# Patient Record
Sex: MALE | Race: WHITE | URBAN - METROPOLITAN AREA
[De-identification: names, ages, dates, MRNs, and addresses within clinical notes are randomized per-mention and may not be internally consistent; named-entity substitution may affect disease eponyms.]

---

## 2017-12-27 ENCOUNTER — TELEPHONE (OUTPATIENT)
Dept: FAMILY MEDICINE | Facility: CLINIC | Age: 27
End: 2017-12-27

## 2017-12-27 NOTE — TELEPHONE ENCOUNTER
ABDOMINAL   PAIN     Onset: off and on for about 6 months    Description:   Character: Cramping and heavy  Location: right upper quadrant rib cage area  Radiation: Back    Intensity: mild    Progression of Symptoms:  same and intermittent    Accompanying Signs & Symptoms:  Fever/Chills?: no   Gas/Bloating: no   Nausea: no   Vomitting: no   Diarrhea?: no   Constipation:no   Dysuria or Hematuria: no      Precipitating factors:   Does the pain change with:     Food: no      BM: no     Urination: no           Patient calling to report reoccuring abdominal pain that started 6 months ago and became worse within the last 2 weeks. He states it comes and goes but now when exercising he reports the pain radiates to his back. He also reports developing some shortness of breath but states his ears feel plugged as well and has some congestion. Patient would like to be evaluated, no openings today. Scheduled for tomorrow, advised if develops any of the above symptoms or if the pain worsens to be evaluated in UC before tomorrow's appointment. Patient/ parent verbalized understanding and agrees with plan.    Zina Alex RN   [facility] - Triage

## 2017-12-28 ENCOUNTER — OFFICE VISIT (OUTPATIENT)
Dept: FAMILY MEDICINE | Facility: CLINIC | Age: 27
End: 2017-12-28
Payer: COMMERCIAL

## 2017-12-28 VITALS
SYSTOLIC BLOOD PRESSURE: 120 MMHG | TEMPERATURE: 97.8 F | WEIGHT: 156 LBS | BODY MASS INDEX: 21.16 KG/M2 | DIASTOLIC BLOOD PRESSURE: 64 MMHG | HEART RATE: 60 BPM

## 2017-12-28 DIAGNOSIS — R10.11 ABDOMINAL PAIN, RIGHT UPPER QUADRANT: Primary | ICD-10-CM

## 2017-12-28 PROCEDURE — 36415 COLL VENOUS BLD VENIPUNCTURE: CPT | Performed by: FAMILY MEDICINE

## 2017-12-28 PROCEDURE — 80053 COMPREHEN METABOLIC PANEL: CPT | Performed by: FAMILY MEDICINE

## 2017-12-28 PROCEDURE — 99214 OFFICE O/P EST MOD 30 MIN: CPT | Performed by: FAMILY MEDICINE

## 2017-12-28 NOTE — NURSING NOTE
Chief Complaint   Patient presents with     Abdominal Pain     right sided        Initial /64  Pulse 60  Temp 97.8  F (36.6  C) (Tympanic)  Wt 156 lb (70.8 kg)  BMI 21.16 kg/m2 Estimated body mass index is 21.16 kg/(m^2) as calculated from the following:    Height as of 5/1/15: 6' (1.829 m).    Weight as of this encounter: 156 lb (70.8 kg).  Medication Reconciliation: complete    No current outpatient prescriptions on file.       Miguel ONEILL, CMA

## 2017-12-28 NOTE — MR AVS SNAPSHOT
"              After Visit Summary   2017    Manfred Escalona    MRN: [id number]           Patient Information     Date Of Birth          1990        Visit Information        Provider Department      2017 3:20 PM [facility]        Today's Diagnoses     Abdominal pain, right upper quadrant    -  1       Follow-ups after your visit        Future tests that were ordered for you today     Open Future Orders        Priority Expected Expires Ordered    US Abdomen Limited Routine  2018            Who to contact     If you have questions or need follow up information about today's clinic visit or your schedule please contact [facility] [place] directly at [phone].  Normal or non-critical lab and imaging results will be communicated to you by MyChart, letter or phone within 4 business days after the clinic has received the results. If you do not hear from us within 7 days, please contact the clinic through Eloquahart or phone. If you have a critical or abnormal lab result, we will notify you by phone as soon as possible.  Submit refill requests through Koru or call your pharmacy and they will forward the refill request to us. Please allow 3 business days for your refill to be completed.          Additional Information About Your Visit        MyChart Information     Koru lets you send messages to your doctor, view your test results, renew your prescriptions, schedule appointments and more. To sign up, go to [email or web address] . Click on \"Log in\" on the left side of the screen, which will take you to the Welcome page. Then click on \"Sign up Now\" on the right side of the page.     You will be asked to enter the access code listed below, as well as some personal information. Please follow the directions to create your username and password.     Your access code is: [email or web address]  Expires: 3/28/2018  3:54 PM     Your access code will  " in 90 days. If you need help or a new code, please call your [facility] clinic or [phone].        Care EveryWhere ID     This is your Care EveryWhere ID. This could be used by other organizations to access your [facility] medical records  [id number]        Your Vitals Were     Pulse Temperature BMI (Body Mass Index)             60 97.8  F (36.6  C) (Tympanic) 21.16 kg/m2          Blood Pressure from Last 3 Encounters:   12/28/17 120/64   05/01/15 104/65    Weight from Last 3 Encounters:   12/28/17 156 lb (70.8 kg)   05/01/15 179 lb 3.2 oz (81.3 kg)              We Performed the Following     Comprehensive metabolic panel        Primary Care Provider Office Phone # Fax #    Colten Dupont [phone]       [address]  [place] [address]        Equal Access to Services     [facility]: Hadii aad ku hadasho Soomaali, waaxda luqadaha, qaybta kaalmada adeegyada, waxay cindyin haybeaun veronica salinas . So [facility] [phone].    ATENCIÓN: Si habla español, tiene a thomas disposición servicios gratuitos de asistencia lingüística. Delmerame al [phone].    We comply with applicable federal civil rights laws and [place] laws. We do not discriminate on the basis of race, color, national origin, age, disability, sex, sexual orientation, or gender identity.            Thank you!     Thank you for choosing [facility] [place]  for your care. Our goal is always to provide you with excellent care. Hearing back from our patients is one way we can continue to improve our services. Please take a few minutes to complete the written survey that you may receive in the mail after your visit with us. Thank you!             Your Updated Medication List - Protect others around you: Learn how to safely use, store and throw away your medicines at www.disposemymeds.org.      Notice  As of 12/28/2017  3:54 PM    You have not been prescribed any medications.

## 2017-12-28 NOTE — PROGRESS NOTES
SUBJECTIVE:   Manfred Escalona is a 27 year old male who presents to clinic today for the following health issues:      ABDOMINAL   PAIN     Onset: off and on for about 6 months    Description:   Character: Cramping and heavy  Location: right upper quadrant rib cage area  Radiation: Back    Intensity: mild    Progression of Symptoms:  same and intermittent    Accompanying Signs & Symptoms:  Fever/Chills?: no   Gas/Bloating: no   Nausea: no   Vomitting: no   Diarrhea?: no   Constipation:no   Dysuria or Hematuria: no       Precipitating factors:   Does the pain change with:     Food: no      BM: no     Urination: no        Patient has right upper quadrant discomfort mostly..  Denies any nausea vomiting likely changes no worsening with use of fatty food.  No current nausea or vomiting no fever chills.  He feels he just overthink about his symptoms and stretching make it better.  Denies any other symptoms at this time          Problem list and histories reviewed & adjusted, as indicated.  Additional history: as documented    Patient Active Problem List   Diagnosis     Generalized anxiety disorder     No past surgical history on file.    Social History   Substance Use Topics     Smoking status: Never Smoker     Smokeless tobacco: Never Used     Alcohol use Yes     History reviewed. No pertinent family history.      No current outpatient prescriptions on file.         Reviewed and updated as needed this visit by clinical staffTobacco  Allergies  Meds  Fam Hx  Soc Hx      Reviewed and updated as needed this visit by Provider         ROS:  C: NEGATIVE for fever, chills, change in weight  E/M: NEGATIVE for ear, mouth and throat problems  R: NEGATIVE for significant cough or SOB  CV: NEGATIVE for chest pain, palpitations or peripheral edema  GI: NEGATIVE for nausea, abdominal pain, heartburn, or change in bowel habits    OBJECTIVE:                                                    /64  Pulse 60  Temp 97.8  F  (36.6  C) (Tympanic)  Wt 156 lb (70.8 kg)  BMI 21.16 kg/m2  Body mass index is 21.16 kg/(m^2).   GENERAL: healthy, alert, well nourished, well hydrated, no distress  NECK: no tenderness, no adenopathy, no asymmetry, no masses, no stiffness; thyroid- normal to palpation  RESP: lungs clear to auscultation - no rales, no rhonchi, no wheezes  CV: regular rates and rhythm, normal S1 S2, no S3 or S4 and no murmur, no click or rub -  ABDOMEN: soft, no tenderness, no  hepatosplenomegaly, no masses, normal bowel sounds  larson sign is negative     ASSESSMENT/PLAN:                                                        ICD-10-CM    1. Abdominal pain, right upper quadrant R10.11 Comprehensive metabolic panel     US Abdomen Limited   Patient symptoms most likely secondary to abdominal wall strain .  Patient has spasmodic kind of discomfort.  I advised him we should get some blood work including liver and kidney function.  We also discussed about ultrasound of gallbladder.  It is ordered.  He is advised if symptoms are not getting better he should get US  done to make sure there is no underlying issue, less likely though with his clinical symptoms .  warning sign were dicussed with the patient.    Colten Dupont MD  [facility]

## 2017-12-28 NOTE — LETTER
January 2, 2018      Manfred Escalona  [address] [place] [address]        Dear ,        I have reviewed your recent labs. Here are the results:     -Liver and gallbladder tests are normal. (ALT,AST, Alk phos, bilirubin), kidney function is normal (Cr, GFR), Sodium is normal, Potassium is normal, Calcium is normal, Glucose is normal (diabetes screening test).   All labs are normal.     Resulted Orders   Comprehensive metabolic panel   Result Value Ref Range    Sodium 138 133 - 144 mmol/L    Potassium 3.8 3.4 - 5.3 mmol/L    Chloride 104 94 - 109 mmol/L    Carbon Dioxide 26 20 - 32 mmol/L    Anion Gap 8 3 - 14 mmol/L    Glucose 87 70 - 99 mg/dL    Urea Nitrogen 12 7 - 30 mg/dL    Creatinine 0.76 0.66 - 1.25 mg/dL    GFR Estimate >90 >60 mL/min/1.7m2      Comment:      Non  GFR Calc    GFR Estimate If Black >90 >60 mL/min/1.7m2      Comment:       GFR Calc    Calcium 9.2 8.5 - 10.1 mg/dL    Bilirubin Total 1.3 0.2 - 1.3 mg/dL    Albumin 4.5 3.4 - 5.0 g/dL    Protein Total 7.5 6.8 - 8.8 g/dL    Alkaline Phosphatase 85 40 - 150 U/L    ALT 45 0 - 70 U/L    AST 33 0 - 45 U/L       If you have any questions or concerns, please call the clinic at the number listed above.       Sincerely,    Colten Dupont MD

## 2017-12-29 LAB
ALBUMIN SERPL-MCNC: 4.5 G/DL (ref 3.4–5)
ALP SERPL-CCNC: 85 U/L (ref 40–150)
ALT SERPL W P-5'-P-CCNC: 45 U/L (ref 0–70)
ANION GAP SERPL CALCULATED.3IONS-SCNC: 8 MMOL/L (ref 3–14)
AST SERPL W P-5'-P-CCNC: 33 U/L (ref 0–45)
BILIRUB SERPL-MCNC: 1.3 MG/DL (ref 0.2–1.3)
BUN SERPL-MCNC: 12 MG/DL (ref 7–30)
CALCIUM SERPL-MCNC: 9.2 MG/DL (ref 8.5–10.1)
CHLORIDE SERPL-SCNC: 104 MMOL/L (ref 94–109)
CO2 SERPL-SCNC: 26 MMOL/L (ref 20–32)
CREAT SERPL-MCNC: 0.76 MG/DL (ref 0.66–1.25)
GFR SERPL CREATININE-BSD FRML MDRD: >90 ML/MIN/1.7M2
GLUCOSE SERPL-MCNC: 87 MG/DL (ref 70–99)
POTASSIUM SERPL-SCNC: 3.8 MMOL/L (ref 3.4–5.3)
PROT SERPL-MCNC: 7.5 G/DL (ref 6.8–8.8)
SODIUM SERPL-SCNC: 138 MMOL/L (ref 133–144)

## 2018-01-04 ENCOUNTER — TELEPHONE (OUTPATIENT)
Dept: FAMILY MEDICINE | Facility: CLINIC | Age: 28
End: 2018-01-04

## 2018-01-04 NOTE — TELEPHONE ENCOUNTER
.Reason for Call:  Other call back    Detailed comments: Pt calling for lab results    Phone Number Patient can be reached at: Home number on file [phone] (home)    Best Time: anytime    Can we leave a detailed message on this number? YES    Call taken on 1/4/2018 at 9:14 AM by Janeth Hurtado

## 2018-01-18 ENCOUNTER — OFFICE VISIT (OUTPATIENT)
Dept: FAMILY MEDICINE | Facility: CLINIC | Age: 28
End: 2018-01-18
Payer: COMMERCIAL

## 2018-01-18 VITALS
TEMPERATURE: 98.5 F | SYSTOLIC BLOOD PRESSURE: 94 MMHG | BODY MASS INDEX: 21.29 KG/M2 | WEIGHT: 157 LBS | DIASTOLIC BLOOD PRESSURE: 60 MMHG | HEART RATE: 60 BPM

## 2018-01-18 DIAGNOSIS — K21.9 GASTROESOPHAGEAL REFLUX DISEASE WITHOUT ESOPHAGITIS: Primary | ICD-10-CM

## 2018-01-18 PROCEDURE — 99213 OFFICE O/P EST LOW 20 MIN: CPT | Performed by: FAMILY MEDICINE

## 2018-01-18 NOTE — MR AVS SNAPSHOT
"              After Visit Summary   2018    Manfred Escalona    MRN: [id number]           Patient Information     Date Of Birth          1990        Visit Information        Provider Department      2018 3:00 PM Colten Dupont MD [facility] [place]        Today's Diagnoses     Gastroesophageal reflux disease without esophagitis    -  1       Follow-ups after your visit        Who to contact     If you have questions or need follow up information about today's clinic visit or your schedule please contact [facility] [place] directly at [phone].  Normal or non-critical lab and imaging results will be communicated to you by Bitbondhart, letter or phone within 4 business days after the clinic has received the results. If you do not hear from us within 7 days, please contact the clinic through Bitbondhart or phone. If you have a critical or abnormal lab result, we will notify you by phone as soon as possible.  Submit refill requests through SaferTaxi or call your pharmacy and they will forward the refill request to us. Please allow 3 business days for your refill to be completed.          Additional Information About Your Visit        MyChart Information     SaferTaxi lets you send messages to your doctor, view your test results, renew your prescriptions, schedule appointments and more. To sign up, go to [email or web address] . Click on \"Log in\" on the left side of the screen, which will take you to the Welcome page. Then click on \"Sign up Now\" on the right side of the page.     You will be asked to enter the access code listed below, as well as some personal information. Please follow the directions to create your username and password.     Your access code is: [email or web address]  Expires: 3/28/2018  3:54 PM     Your access code will  in 90 days. If you need help or a new code, please call your [facility] or [phone].        Care EveryWhere ID     This is your Care EveryWhere ID. " This could be used by other organizations to access your [facility] medical records  [id number]        Your Vitals Were     Pulse Temperature BMI (Body Mass Index)             60 98.5  F (36.9  C) (Tympanic) 21.29 kg/m2          Blood Pressure from Last 3 Encounters:   01/18/18 94/60   12/28/17 120/64   05/01/15 104/65    Weight from Last 3 Encounters:   01/18/18 157 lb (71.2 kg)   12/28/17 156 lb (70.8 kg)   05/01/15 179 lb 3.2 oz (81.3 kg)              Today, you had the following     No orders found for display         Today's Medication Changes          These changes are accurate as of: 1/18/18  3:26 PM.  If you have any questions, ask your nurse or doctor.               Start taking these medicines.        Dose/Directions    omeprazole 20 MG CR capsule   Commonly known as:  priLOSEC   Used for:  Gastroesophageal reflux disease without esophagitis   Started by:  Colten Dupont MD        Dose:  20 mg   Take 1 capsule (20 mg) by mouth daily   Quantity:  30 capsule   Refills:  1            Where to get your medicines      These medications were sent to [facility] [place] - [address]  [address], [place] [address]     Phone:  [phone]     omeprazole 20 MG CR capsule                Primary Care Provider Office Phone # Fax #    Colten Dupont [phone]       [address] [place] [address] [address]  [place] [address]        Equal Access to Services     [facility]: Hadii aad ku hadasho Soomaali, waaxda luqadaha, qaybta kaalmada adeegyada, bob selby. So [facility] [phone].    ATENCIÓN: Si habla español, tiene a thomas disposición servicios gratuitos de asistencia lingüística. Llame al [phone].    We comply with applicable federal civil rights laws and [place] laws. We do not discriminate on the basis of race, color, national origin, age, disability, sex, sexual orientation, or gender identity.            Thank you!      Thank you for choosing [facility] [place]  for your care. Our goal is always to provide you with excellent care. Hearing back from our patients is one way we can continue to improve our services. Please take a few minutes to complete the written survey that you may receive in the mail after your visit with us. Thank you!             Your Updated Medication List - Protect others around you: Learn how to safely use, store and throw away your medicines at www.disposemymeds.org.          This list is accurate as of: 1/18/18  3:26 PM.  Always use your most recent med list.                   Brand Name Dispense Instructions for use Diagnosis    omeprazole 20 MG CR capsule    priLOSEC    30 capsule    Take 1 capsule (20 mg) by mouth daily    Gastroesophageal reflux disease without esophagitis

## 2018-01-18 NOTE — NURSING NOTE
Chief Complaint   Patient presents with     Abdominal Pain       Initial BP 94/60  Pulse 60  Temp 98.5  F (36.9  C) (Tympanic)  Wt 157 lb (71.2 kg)  BMI 21.29 kg/m2 Estimated body mass index is 21.29 kg/(m^2) as calculated from the following:    Height as of 5/1/15: 6' (1.829 m).    Weight as of this encounter: 157 lb (71.2 kg).  Medication Reconciliation: complete    No current outpatient prescriptions on file.       Miguel ONEILL, CMA

## 2018-01-18 NOTE — PROGRESS NOTES
SUBJECTIVE:   Manfred Escalona is a 27 year old male who presents to clinic today for the following health issues:      GERD/Heartburn  Onset: 2+ weeks   Patient feels burning sensation in the back of the throat.  This has been ongoing on and off with some reflux symptoms.    Description:     Burning in chest: no     Intensity: moderate    Progression of Symptoms: worsening    Accompanying Signs & Symptoms:  Does it feel like food gets stuck: YES  Nausea: no   Vomiting (bloody?): no   Abdominal Pain: YES  Black-Tarry stools: no :  Bloody stools: no     History:   Previous ulcers: no     Precipitating factors:   Caffeine use: YES- few cups of coffee daily  Alcohol use: YES  NSAID/Aspirin use: no   Tobacco use: no  Worse with no particular food or drink.      Therapies Tried and outcome:TUMS at night          Problem list and histories reviewed & adjusted, as indicated.  Additional history: as documented    Patient Active Problem List   Diagnosis     Generalized anxiety disorder     No past surgical history on file.    Social History   Substance Use Topics     Smoking status: Never Smoker     Smokeless tobacco: Never Used     Alcohol use Yes     No family history on file.      Current Outpatient Prescriptions   Medication Sig Dispense Refill     omeprazole (PRILOSEC) 20 MG CR capsule Take 1 capsule (20 mg) by mouth daily 30 capsule 1         Reviewed and updated as needed this visit by clinical staffTobacco       Reviewed and updated as needed this visit by Provider         ROS:  C: NEGATIVE for fever, chills, change in weight  E/M: NEGATIVE for ear, mouth and throat problems  R: NEGATIVE for significant cough or SOB  CV: NEGATIVE for chest pain, palpitations or peripheral edema    OBJECTIVE:                                                    BP 94/60  Pulse 60  Temp 98.5  F (36.9  C) (Tympanic)  Wt 157 lb (71.2 kg)  BMI 21.29 kg/m2  Body mass index is 21.29 kg/(m^2).   GENERAL: healthy, alert, well nourished,  well hydrated, no distress  RESP: lungs clear to auscultation - no rales, no rhonchi, no wheezes  CV: regular rates and rhythm, normal S1 S2, no S3 or S4 and no murmur, no click or rub -  ABDOMEN: soft, no tenderness, no  hepatosplenomegaly, no masses, normal bowel sounds         ASSESSMENT/PLAN:                                                        ICD-10-CM    1. Gastroesophageal reflux disease without esophagitis K21.9 omeprazole (PRILOSEC) 20 MG CR capsule       Patient has symptoms of upper GI most likely GERD.  He is advised to use omeprazole once daily for the next 1 month.  Advised not to use excessive coffee.  Decrease caffeine intake.  Advised not to eat and sleep right away.  Keep the head little elevated.  Warning signs were discussed with the patient if symptoms continue despite this he would follow-up and also discussed advanced imaging.    Colten Dupont MD  [facility]

## 2018-01-31 ENCOUNTER — TELEPHONE (OUTPATIENT)
Dept: FAMILY MEDICINE | Facility: CLINIC | Age: 28
End: 2018-01-31

## 2018-01-31 NOTE — TELEPHONE ENCOUNTER
Non-detailed message left to return our call.  Kylah Chaudhary RN - Triage  [facility]

## 2018-01-31 NOTE — TELEPHONE ENCOUNTER
"Spoke with patient, states he has been taking omeprazole as prescribed at OV x 2 weeks and states his indigestion is getting worse. He states prior he had a feeling like food was stuck in his throat but now he feels like he is \"breathing fire\". He states it is worse in the morning and evening. He states he has been avoiding trigger foods/ beverages and has been trying not to eat within 3 hours before bed.  He is wondering if he should continue medication for another week or two, increase dose, come in for a f/u or move forward with imaging discussed at OV? Please advise.     1/18/18 OV note:    1. Gastroesophageal reflux disease without esophagitis K21.9 omeprazole (PRILOSEC) 20 MG CR capsule         Patient has symptoms of upper GI most likely GERD.  He is advised to use omeprazole once daily for the next 1 month.  Advised not to use excessive coffee.  Decrease caffeine intake.  Advised not to eat and sleep right away.  Keep the head little elevated.  Warning signs were discussed with the patient if symptoms continue despite this he would follow-up and also discussed advanced imaging.      Triage to call with response [phone] okay to leave detailed message.     Zina Alex RN   [facility] - Triage     "

## 2018-01-31 NOTE — TELEPHONE ENCOUNTER
Pt called and was seen on 1/18 for heartburn and indigestion. He has been taking prilosec and he has noticed that the symptoms are getting worse. Please call the pt and advise. Pt can be reached at [phone]. Thank you.  Rubia Connelly,

## 2018-01-31 NOTE — TELEPHONE ENCOUNTER
Detailed message left for patient with information noted from provider below. Left call back number for any additional questions.  Arlyn Greene RN  Triage-[employer]

## 2018-10-26 ENCOUNTER — OFFICE VISIT (OUTPATIENT)
Dept: FAMILY MEDICINE | Facility: CLINIC | Age: 28
End: 2018-10-26
Payer: COMMERCIAL

## 2018-10-26 VITALS
RESPIRATION RATE: 16 BRPM | SYSTOLIC BLOOD PRESSURE: 128 MMHG | BODY MASS INDEX: 21.26 KG/M2 | TEMPERATURE: 97.9 F | WEIGHT: 157 LBS | OXYGEN SATURATION: 100 % | HEIGHT: 72 IN | HEART RATE: 54 BPM | DIASTOLIC BLOOD PRESSURE: 66 MMHG

## 2018-10-26 DIAGNOSIS — Z71.84 ENCOUNTER FOR COUNSELING FOR TRAVEL: Primary | ICD-10-CM

## 2018-10-26 DIAGNOSIS — Z23 NEED FOR PROPHYLACTIC VACCINATION AND INOCULATION AGAINST INFLUENZA: ICD-10-CM

## 2018-10-26 DIAGNOSIS — Z23 NEED FOR IMMUNIZATION AGAINST TYPHOID: ICD-10-CM

## 2018-10-26 DIAGNOSIS — Z23 NEED FOR HEPATITIS A VACCINATION: ICD-10-CM

## 2018-10-26 PROCEDURE — 90686 IIV4 VACC NO PRSV 0.5 ML IM: CPT | Performed by: PHYSICIAN ASSISTANT

## 2018-10-26 PROCEDURE — 90632 HEPA VACCINE ADULT IM: CPT | Performed by: PHYSICIAN ASSISTANT

## 2018-10-26 PROCEDURE — 99401 PREV MED CNSL INDIV APPRX 15: CPT | Mod: 25 | Performed by: PHYSICIAN ASSISTANT

## 2018-10-26 PROCEDURE — 90471 IMMUNIZATION ADMIN: CPT | Performed by: PHYSICIAN ASSISTANT

## 2018-10-26 PROCEDURE — 90472 IMMUNIZATION ADMIN EACH ADD: CPT | Performed by: PHYSICIAN ASSISTANT

## 2018-10-26 PROCEDURE — 90691 TYPHOID VACCINE IM: CPT | Performed by: PHYSICIAN ASSISTANT

## 2018-10-26 RX ORDER — AZITHROMYCIN 500 MG/1
500 TABLET, FILM COATED ORAL DAILY
Qty: 9 TABLET | Refills: 0 | Status: SHIPPED | OUTPATIENT
Start: 2018-10-26 | End: 2018-10-29

## 2018-10-26 NOTE — PATIENT INSTRUCTIONS
See travel packet provided  Recommend ultrathon, pepto bismol and imodium  Also bring hand  and sun screen with you.  Safe Travels     Today October 26, 2018 you received the    Flu Vaccine    Hepatitis A Vaccine - Please return on 4/24/19 or later for your 2nd and final dose.    Typhoid - injectable. This vaccine is valid for two years.   .    These appointments can be made as a NURSE ONLY visit.    **It is very important for the vaccinations to be given on the scheduled day(s), this helps ensure you receive the full effectiveness of the vaccine.**    Please call [facility] with any questions [phone]    Thank you for visiting [facility]

## 2018-10-26 NOTE — PROGRESS NOTES
SUBJECTIVE: Manfred Escalona , a 28 year old  male, presents for counseling and information regarding upcoming travel to [place], [place], and [place]. Special medical concerns include: none. He anticipates the following unusual exposures: none.    Itinerary:  [place], [place] and [place]     Departure Date: 11/15/18 Return date: 11/24/18    Reason for travel (i.e. Business, pleasure): pleasure     Visiting an urban or rural area?: both     Accommodations (i.e. hotel, hostel, friends, family, etc): hotel and friend     Women - First day of your last period: NA     IMMUNIZATION HISTORY  Have you received any vaccinations in the past 4 weeks?  No  Have you ever fainted from having your blood drawn or from an injection?  No  Have you ever had a fever reaction to vaccination?  No  Have you ever had any bad reaction or side effect from any vaccination?  No  Have you ever had hepatitis A or B vaccine?  Yes   Do you live (or work closely) with anyone who has AIDS, an AIDS-like condition, any other immune disorder or who is on chemotherapy for cancer?  No  Have you received any injection of immune globulin or any blood products during the past 12 months?  No    GENERAL MEDICAL HISTORY  Do you have a medical condition that warrants maintenance medication or physician follow-up?  No  Do you have a medical condition that is stable now, but that may recur while traveling?  No  Has your spleen been removed?  No  Have you had an acute illness or a fever in the past 48 hours?  No  Are you pregnant, or might you become pregnant on this trip?  Any chance of pregnancy?  No  Are you breastfeeding?  No  Do you have HIV, AIDS, an AIDS-like condition, any other immune disorder, leukemia or cancer?  No  Do you have a severe combined immunodeficiency disease?  No  Have you had your thymus gland removed or history of problems with your thymus, such as myasthenia gravis, DiGeorge syndrome, or thymoma?  No    Do you have severe thrombocytopenia  (low platelet count) or a coagulation disorder?  No  Have you ever had a convulsion, seizure, epilepsy, neurologic condition or brain infection?  No  Do you have any stomach conditions?  No  Do you have a G6PD deficiency?  No  Do you have severe renal or kidney impairment?  No  Do you have a history of psychiatric problems?  No  Do you have a problem with strange dreams and/or nightmares?  No  Do you have insomnia?  No  Do you have problems with vaginitis?  No  Do you have psoriasis?  No  Are you prone to motion sickness?  No  Have you ever had headaches, nausea, vomiting, or breathing problems from altitude exposure?  No      No past medical history on file.   Immunization History   Administered Date(s) Administered     TDAP Vaccine (Adacel) 08/11/2008, 06/14/2013       Current Outpatient Prescriptions   Medication Sig Dispense Refill     omeprazole (PRILOSEC) 20 MG CR capsule Take 1 capsule (20 mg) by mouth daily 30 capsule 1     No Known Allergies     EXAM: deferred    Immunizations discussed include: Hepatitis A, Typhoid and influenza  Malaraia prophylaxis recommended: not needed  Symptomatic treatment for traveler's diarrhea: bismuth subsalicylate, loperamide/diphenoxylate and azithromycin    ASSESSMENT/PLAN:    (Z71.89) Encounter for counseling for travel  (primary encounter diagnosis)    Comment: Hepatitis A, typhoid, and influenza vaccines today. Patient will return or follow-up with PCP in 6 months for Hep A booster. Prophylaxis given for Traveler's diarrhea and is not needed for Malaria. All questions were answered.     Plan: azithromycin (ZITHROMAX) 500 MG tablet            (Z23) Need for hepatitis A vaccination  Comment:   Plan: HEPA VACCINE ADULT IM            (Z23) Need for immunization against typhoid  Comment:   Plan: TYPHOID VACCINE, IM            (Z23) Need for prophylactic vaccination and inoculation against influenza  Comment:   Plan: FLU VACCINE, SPLIT VIRUS, IM (QUADRIVALENT)         [50602]-  >3 YRS, Vaccine Administration,         Initial [33184]              I have reviewed general recommendations for safe travel   including: food/water precautions, insect avoidance, safe sex   practices given high prevalence of HIV and other STDs,   roadway safety. Educational materials and links to the CDC   Traveler's health website have been provided.    Total time 20 minutes, greater than 50 percent in counseling   and coordination of care.

## 2018-10-26 NOTE — MR AVS SNAPSHOT
After Visit Summary   10/26/2018    Manfred Escalona    MRN: [id number]           Patient Information     Date Of Birth          1990        Visit Information        Provider Department      10/26/2018 8:40 AM Sarath Ibanez PA-C [facility]        Today's Diagnoses     Encounter for counseling for travel    -  1      Care Instructions    See travel packet provided  Recommend ultrathon, pepto bismol and imodium  Also bring hand  and sun screen with you.  Safe Travels     Today October 26, 2018 you received the    Flu Vaccine    Hepatitis A Vaccine - Please return on 4/24/19 or later for your 2nd and final dose.    Typhoid - injectable. This vaccine is valid for two years.   .    These appointments can be made as a NURSE ONLY visit.    **It is very important for the vaccinations to be given on the scheduled day(s), this helps ensure you receive the full effectiveness of the vaccine.**    Please call [facility] with any questions [phone]    Thank you for visiting [facility]            Follow-ups after your visit        Who to contact     If you have questions or need follow up information about today's clinic visit or your schedule please contact [facility] directly at [phone].  Normal or non-critical lab and imaging results will be communicated to you by MyChart, letter or phone within 4 business days after the clinic has received the results. If you do not hear from us within 7 days, please contact the clinic through MyChart or phone. If you have a critical or abnormal lab result, we will notify you by phone as soon as possible.  Submit refill requests through [employer] or call your pharmacy and they will forward the refill request to us. Please allow 3 business days for your refill to be completed.          Additional Information About Your Visit        MyChart Information     [employer] lets you  "send messages to your doctor, view your test results, renew your prescriptions, schedule appointments and more. To sign up, go to [email or web address] . Click on \"Log in\" on the left side of the screen, which will take you to the Welcome page. Then click on \"Sign up Now\" on the right side of the page.     You will be asked to enter the access code listed below, as well as some personal information. Please follow the directions to create your username and password.     Your access code is: [email or web address]  Expires: 2019  9:10 AM     Your access code will  in 90 days. If you need help or a new code, please call your [facility] clinic or [phone].        Care EveryWhere ID     This is your Care EveryWhere ID. This could be used by other organizations to access your [facility] medical records  [id number]        Your Vitals Were     Pulse Temperature Respirations Height Pulse Oximetry BMI (Body Mass Index)    54 97.9  F (36.6  C) (Oral) 16 6' (1.829 m) 100% 21.29 kg/m2       Blood Pressure from Last 3 Encounters:   10/26/18 128/66   18 94/60   17 120/64    Weight from Last 3 Encounters:   10/26/18 157 lb (71.2 kg)   18 157 lb (71.2 kg)   17 156 lb (70.8 kg)              Today, you had the following     No orders found for display         Today's Medication Changes          These changes are accurate as of 10/26/18  9:10 AM.  If you have any questions, ask your nurse or doctor.               Start taking these medicines.        Dose/Directions    azithromycin 500 MG tablet   Commonly known as:  ZITHROMAX   Used for:  Encounter for counseling for travel   Started by:  Sarath Ibanez PA-C        Dose:  500 mg   Take 1 tablet (500 mg) by mouth daily for 3 days For traveler's diarrhea. May repeat if needed.   Quantity:  9 tablet   Refills:  0            Where to get your medicines      These medications were sent to [facility] [place], [place] - [address]  " [address] [address] [facility] [address]     Phone:  [phone]     azithromycin 500 MG tablet                Primary Care Provider Office Phone # Fax #    Colten Dupont [phone]       [address]  [place] [address]        Equal Access to Services     : Hadii aad ku hadasho Soomaali, waaxda luqadaha, qaybta kaalmada adeegyada, waxay idiin hayaan adeeg kharash la'aan . So [facility] [phone].    ATENCIÓN: Si habla español, tiene a thomas disposición servicios gratuitos de asistencia lingüística. Llame al [phone].    We comply with applicable federal civil rights laws and [place] laws. We do not discriminate on the basis of race, color, national origin, age, disability, sex, sexual orientation, or gender identity.            Thank you!     Thank you for choosing [facility]  for your care. Our goal is always to provide you with excellent care. Hearing back from our patients is one way we can continue to improve our services. Please take a few minutes to complete the written survey that you may receive in the mail after your visit with us. Thank you!             Your Updated Medication List - Protect others around you: Learn how to safely use, store and throw away your medicines at www.disposemymeds.org.          This list is accurate as of 10/26/18  9:10 AM.  Always use your most recent med list.                   Brand Name Dispense Instructions for use Diagnosis    azithromycin 500 MG tablet    ZITHROMAX    9 tablet    Take 1 tablet (500 mg) by mouth daily for 3 days For traveler's diarrhea. May repeat if needed.    Encounter for counseling for travel       omeprazole 20 MG CR capsule    priLOSEC    30 capsule    Take 1 capsule (20 mg) by mouth daily    Gastroesophageal reflux disease without esophagitis

## 2018-10-26 NOTE — PROGRESS NOTES
